# Patient Record
Sex: FEMALE | URBAN - METROPOLITAN AREA
[De-identification: names, ages, dates, MRNs, and addresses within clinical notes are randomized per-mention and may not be internally consistent; named-entity substitution may affect disease eponyms.]

---

## 2017-04-27 ENCOUNTER — OFFICE VISIT (OUTPATIENT)
Dept: INTERNAL MEDICINE CLINIC | Age: 32
End: 2017-04-27

## 2017-04-27 VITALS
TEMPERATURE: 98.8 F | HEIGHT: 63 IN | BODY MASS INDEX: 29.59 KG/M2 | SYSTOLIC BLOOD PRESSURE: 138 MMHG | OXYGEN SATURATION: 98 % | RESPIRATION RATE: 18 BRPM | WEIGHT: 167 LBS | HEART RATE: 88 BPM | DIASTOLIC BLOOD PRESSURE: 82 MMHG

## 2017-04-27 DIAGNOSIS — Z00.00 ROUTINE GENERAL MEDICAL EXAMINATION AT A HEALTH CARE FACILITY: Primary | ICD-10-CM

## 2017-04-27 DIAGNOSIS — J02.9 ACUTE PHARYNGITIS, UNSPECIFIED ETIOLOGY: ICD-10-CM

## 2017-04-27 DIAGNOSIS — E66.3 OVERWEIGHT (BMI 25.0-29.9): ICD-10-CM

## 2017-04-27 RX ORDER — PHENTERMINE HYDROCHLORIDE 37.5 MG/1
37.5 TABLET ORAL
Qty: 30 TAB | Refills: 0 | Status: SHIPPED | OUTPATIENT
Start: 2017-04-27 | End: 2017-05-25 | Stop reason: SDUPTHER

## 2017-04-27 RX ORDER — AMOXICILLIN 500 MG/1
500 CAPSULE ORAL 2 TIMES DAILY
Qty: 20 CAP | Refills: 0 | Status: SHIPPED | OUTPATIENT
Start: 2017-04-27 | End: 2018-02-23 | Stop reason: ALTCHOICE

## 2017-04-27 NOTE — PATIENT INSTRUCTIONS

## 2017-04-27 NOTE — PROGRESS NOTES
History:   Ramiro Hammonds is a 28 y.o. female presenting today for an initial visit for Complete Physical and Weight Loss  She needs a PCP and lives nearby. Went to [facility] for acute problems. 1.  CPE:  Hasn't had blood work recently. Maybe had cholesterol checked but no h/o hyperlipidemia. Has had anemia before. Took iron and resolved. Was dietary, not blood loss. 2.  Overweight:  She has been working out and eating better. But still has been gaining weight. Was 120s-130s in past.  Has slowly gained this weight. She's been on phentermine and would like to try it again. She was on 37.5 mg. No side effects. 3.  ST:  Her throat is very sore. For 6 days. Had headache, but no fever, no congestion, cough. She does have history of strep a number of times requiring abx like Amoxil. No sick contacts    4. Toenail problems:  She says several of her small toenails are yellowish. But she's wearing toenail polish so can't show them to me today. Review of Systems   Constitutional: Negative. HENT: Positive for sore throat. Eyes: Positive for blurred vision. Respiratory: Negative. Cardiovascular: Negative. Gastrointestinal: Negative. Genitourinary: Negative. Musculoskeletal: Negative. Skin: Negative. Neurological: Positive for headaches. Psychiatric/Behavioral: Negative. Past Medical History:   Diagnosis Date    Headache        Past Surgical History:   Procedure Laterality Date    HX WISDOM TEETH EXTRACTION         Social History     Social History    Marital status:      Spouse name: N/A    Number of children: N/A    Years of education: N/A     Occupational History    Not on file.      Social History Main Topics    Smoking status: Never Smoker    Smokeless tobacco: Never Used    Alcohol use No    Drug use: No    Sexual activity: Yes     Partners: Male     Birth control/ protection: None     Other Topics Concern    Not on file     Social History Narrative    No narrative on file       Family History   Problem Relation Age of Onset    Elevated Lipids Mother     Hypertension Mother     Diabetes Father     Elevated Lipids Father     Hypertension Father     Bleeding Prob Maternal Grandmother        No current outpatient prescriptions on file prior to visit. No current facility-administered medications on file prior to visit. No Known Allergies    Objective:   VS:    Visit Vitals    /82 (BP 1 Location: Left arm, BP Patient Position: Sitting)    Pulse 88    Temp 98.8 °F (37.1 °C) (Oral)    Resp 18    Ht 5' 3\" (1.6 m)    Wt 167 lb (75.8 kg)    SpO2 98%    BMI 29.58 kg/m2     Physical Exam   Constitutional: She appears well-developed and well-nourished. No distress. HENT:   Head: Normocephalic and atraumatic. Right Ear: External ear normal.   Left Ear: External ear normal.   Nose: Nose normal.   Throat: Tonsils erythematous but no exudates   Eyes: Conjunctivae are normal. Pupils are equal, round, and reactive to light. No scleral icterus. Neck: Normal range of motion. Neck supple. No tracheal deviation present. No thyromegaly present. Cardiovascular: Normal rate, regular rhythm, normal heart sounds and intact distal pulses. Exam reveals no gallop and no friction rub. No murmur heard. Pulmonary/Chest: Effort normal and breath sounds normal. She has no wheezes. She has no rales. Abdominal: Soft. Bowel sounds are normal. She exhibits no distension. There is no hepatosplenomegaly. There is no tenderness. Musculoskeletal: Normal range of motion. She exhibits no edema or tenderness. Lymphadenopathy:     She has cervical adenopathy (bilateral anterior:  tender, mildly enlarged). Skin: Skin is warm and dry. No rash noted. No pallor. Psychiatric: She has a normal mood and affect. Judgment normal.   Nursing note and vitals reviewed.       Assessment/ Plan:     [facility] was seen today for complete physical and weight loss. Diagnoses and all orders for this visit:    Routine general medical examination at a health care facility  -     LIPID PANEL; Future  -     METABOLIC PANEL, COMPREHENSIVE; Future  -     CBC WITH AUTOMATED DIFF; Future  -     TSH 3RD GENERATION; Future  -     AMB POC URINALYSIS DIP STICK AUTO W/O MICRO    Acute pharyngitis, unspecified etiology: Will tx empirically for strep since the hx and exam are suggestive. -     amoxicillin (AMOXIL) 500 mg capsule; Take 1 Cap by mouth two (2) times a day. Overweight (BMI 25.0-29. 9): Will try phentermine which she's had before. Recommended doing along with diet and exercise. Will recheck in 1 month. -     phentermine (ADIPEX-P) 37.5 mg tablet; Take 1 Tab by mouth every morning. Max Daily Amount: 37.5 mg.     I have discussed the diagnosis with the patient and the intended plan as seen in the above orders. The patient verbalized understanding and agrees with the plan. Follow-up Disposition:  Return in about 1 month (around 5/27/2017) for weight management.     Estefani Elder MD

## 2017-04-27 NOTE — PROGRESS NOTES
Chief Complaint   Patient presents with    Complete Physical    Weight Loss   1. Have you been to the ER, urgent care clinic since your last visit? Hospitalized since your last visit? No    2. Have you seen or consulted any other health care providers outside of the [facility] since your last visit? Include any pap smears or colon screening.  No

## 2017-04-27 NOTE — LETTER
NOTIFICATION RETURN TO WORK / SCHOOL 
 
4/27/2017 11:22 AM 
 
Ms. Wolfgang Campbell [address] [place] [address] To Whom It May Concern: 
 
Wolfgang Campbell is currently under the care of [address] [place] [address]. She will return to work on: 4/28/17 If there are questions or concerns please have the patient contact our office.  
 
 
 
Sincerely, 
 
 
Sandra Davis MD

## 2017-04-27 NOTE — MR AVS SNAPSHOT
Visit Information Date & Time Provider Department Dept. Phone Encounter #  
 4/27/2017  9:30 AM Torres Smith MD [facility] [phone] [id number] Follow-up Instructions Return in about 1 month (around 5/27/2017) for weight management. Upcoming Health Maintenance Date Due DTaP/Tdap/Td series (1 - Tdap) 8/9/2007 PAP AKA CERVICAL CYTOLOGY 8/9/2007 INFLUENZA AGE 9 TO ADULT 8/1/2016 Allergies as of 4/27/2017  Review Complete On: 4/27/2017 By: Torres Smith MD  
 No Known Allergies Current Immunizations  Never Reviewed No immunizations on file. Not reviewed this visit You Were Diagnosed With   
  
 Codes Comments Routine general medical examination at a health care facility    -  Primary ICD-10-CM: Z00.00 ICD-9-CM: V70.0 Acute pharyngitis, unspecified etiology     ICD-10-CM: J02.9 ICD-9-CM: 381 Overweight (BMI 25.0-29. 9)     ICD-10-CM: F37.2 ICD-9-CM: 278.02 Vitals Height(growth percentile) Weight(growth percentile) BMI OB Status Smoking Status 5' 3\" (1.6 m) 167 lb (75.8 kg) 29.58 kg/m2 Having regular periods Never Smoker BMI and BSA Data Body Mass Index Body Surface Area  
 29.58 kg/m 2 1.84 m 2 Preferred Pharmacy [facility] [address] [place] [address] [phone] Your Updated Medication List  
  
   
This list is accurate as of: 4/27/17 10:11 AM.  Always use your most recent med list.  
  
  
  
  
 amoxicillin 500 mg capsule Commonly known as:  AMOXIL Take 1 Cap by mouth two (2) times a day. phentermine 37.5 mg tablet Commonly known as:  ADIPEX-P Take 1 Tab by mouth every morning. Max Daily Amount: 37.5 mg.  
  
  
  
  
Prescriptions Printed Refills  
 phentermine (ADIPEX-P) 37.5 mg tablet 0 Sig: Take 1 Tab by mouth every morning. Max Daily Amount: 37.5 mg.  
 Class: Print  Route: Oral  
  
 Prescriptions Sent to Pharmacy Refills  
 amoxicillin (AMOXIL) 500 mg capsule 0 Sig: Take 1 Cap by mouth two (2) times a day. Class: Normal  
 Pharmacy: [address], [address] [facility] #: [phone] Route: Oral  
  
Follow-up Instructions Return in about 1 month (around 5/27/2017) for weight management. Patient Instructions Well Visit, Ages 25 to 48: Care Instructions Your Care Instructions Physical exams can help you stay healthy. Your doctor has checked your overall health and may have suggested ways to take good care of yourself. He or she also may have recommended tests. At home, you can help prevent illness with healthy eating, regular exercise, and other steps. Follow-up care is a key part of your treatment and safety. Be sure to make and go to all appointments, and call your doctor if you are having problems. It's also a good idea to know your test results and keep a list of the medicines you take. How can you care for yourself at home? · Reach and stay at a healthy weight. This will lower your risk for many problems, such as obesity, diabetes, heart disease, and high blood pressure. · Get at least 30 minutes of physical activity on most days of the week. Walking is a good choice. You also may want to do other activities, such as running, swimming, cycling, or playing tennis or team sports. Discuss any changes in your exercise program with your doctor. · Do not smoke or allow others to smoke around you. If you need help quitting, talk to your doctor about stop-smoking programs and medicines. These can increase your chances of quitting for good. · Talk to your doctor about whether you have any risk factors for sexually transmitted infections (STIs). Having one sex partner (who does not have STIs and does not have sex with anyone else) is a good way to avoid these infections. · Use birth control if you do not want to have children at this time. Talk with your doctor about the choices available and what might be best for you. · Protect your skin from too much sun. When you're outdoors from 10 a.m. to 4 p.m., stay in the shade or cover up with clothing and a hat with a wide brim. Wear sunglasses that block UV rays. Even when it's cloudy, put broad-spectrum sunscreen (SPF 30 or higher) on any exposed skin. · See a dentist one or two times a year for checkups and to have your teeth cleaned. · Wear a seat belt in the car. · Drink alcohol in moderation, if at all. That means no more than 2 drinks a day for men and 1 drink a day for women. Follow your doctor's advice about when to have certain tests. These tests can spot problems early. For everyone · Cholesterol. Have the fat (cholesterol) in your blood tested after age 21. Your doctor will tell you how often to have this done based on your age, family history, or other things that can increase your risk for heart disease. · Blood pressure. Have your blood pressure checked during a routine doctor visit. Your doctor will tell you how often to check your blood pressure based on your age, your blood pressure results, and other factors. · Vision. Talk with your doctor about how often to have a glaucoma test. 
· Diabetes. Ask your doctor whether you should have tests for diabetes. · Colon cancer. Have a test for colon cancer at age 48. You may have one of several tests. If you are younger than 48, you may need a test earlier if you have any risk factors. Risk factors include whether you already had a precancerous polyp removed from your colon or whether your parent, brother, sister, or child has had colon cancer. For women · Breast exam and mammogram. Talk to your doctor about when you should have a clinical breast exam and a mammogram. Medical experts differ on whether and how often women under 50 should have these tests.  Your doctor can help you decide what is right for you. · Pap test and pelvic exam. Begin Pap tests at age 24. A Pap test is the best way to find cervical cancer. The test often is part of a pelvic exam. Ask how often to have this test. 
· Tests for sexually transmitted infections (STIs). Ask whether you should have tests for STIs. You may be at risk if you have sex with more than one person, especially if your partners do not wear condoms. For men · Tests for sexually transmitted infections (STIs). Ask whether you should have tests for STIs. You may be at risk if you have sex with more than one person, especially if you do not wear a condom. · Testicular cancer exam. Ask your doctor whether you should check your testicles regularly. · Prostate exam. Talk to your doctor about whether you should have a blood test (called a PSA test) for prostate cancer. Experts differ on whether and when men should have this test. Some experts suggest it if you are older than 39 and are -American or have a father or brother who got prostate cancer when he was younger than 72. When should you call for help? Watch closely for changes in your health, and be sure to contact your doctor if you have any problems or symptoms that concern you. Where can you learn more? Go to http://chacha-adria.info/. Enter P072 in the search box to learn more about \"Well Visit, Ages 25 to 48: Care Instructions. \" Current as of: July 19, 2016 Content Version: 11.2 © 6540-8228 Healthwise, Incorporated. Care instructions adapted under license by [employer] (which disclaims liability or warranty for this information). If you have questions about a medical condition or this instruction, always ask your healthcare professional. [place] [address] any warranty or liability for your use of this information. Introducing [facility]!    
 Heather Rosa introduces [employer] patient portal. Now you can access parts of your medical record, email your doctor's office, and request medication refills online. 1. In your internet browser, go to [email or web address] 2. Click on the First Time User? Click Here link in the Sign In box. You will see the New Member Sign Up page. 3. Enter your [employer] Access Code exactly as it appears below. You will not need to use this code after youve completed the sign-up process. If you do not sign up before the expiration date, you must request a new code. · [employer] Access Code: [email or web address] Expires: 7/26/2017 10:10 AM 
 
4. Enter the last four digits of your Social Security Number (xxxx) and Date of Birth (mm/dd/yyyy) as indicated and click Submit. You will be taken to the next sign-up page. 5. Create a [employer] ID. This will be your [employer] login ID and cannot be changed, so think of one that is secure and easy to remember. 6. Create a [employer] password. You can change your password at any time. 7. Enter your Password Reset Question and Answer. This can be used at a later time if you forget your password. 8. Enter your e-mail address. You will receive e-mail notification when new information is available in [address] 9. Click Sign Up. You can now view and download portions of your medical record. 10. Click the Download Summary menu link to download a portable copy of your medical information. If you have questions, please visit the Frequently Asked Questions section of the [employer] website. Remember, [employer] is NOT to be used for urgent needs. For medical emergencies, dial 911. Now available from your iPhone and Android! Please provide this summary of care documentation to your next provider. If you have any questions after today's visit, please call [phone].

## 2017-05-02 ENCOUNTER — TELEPHONE (OUTPATIENT)
Dept: INTERNAL MEDICINE CLINIC | Age: 32
End: 2017-05-02

## 2017-05-02 NOTE — TELEPHONE ENCOUNTER
Please call the patient and let her know that her blood work came back. Everything was normal.  No anemia. Only abnormality was cholesterol (229 with ). She doesn't need medication, but diet, exercise, and weight loss will help.

## 2017-05-08 DIAGNOSIS — Z00.00 ROUTINE GENERAL MEDICAL EXAMINATION AT A HEALTH CARE FACILITY: ICD-10-CM

## 2017-05-25 ENCOUNTER — OFFICE VISIT (OUTPATIENT)
Dept: INTERNAL MEDICINE CLINIC | Age: 32
End: 2017-05-25

## 2017-05-25 VITALS
HEART RATE: 65 BPM | RESPIRATION RATE: 18 BRPM | BODY MASS INDEX: 28.88 KG/M2 | DIASTOLIC BLOOD PRESSURE: 73 MMHG | HEIGHT: 63 IN | TEMPERATURE: 98 F | WEIGHT: 163 LBS | OXYGEN SATURATION: 98 % | SYSTOLIC BLOOD PRESSURE: 113 MMHG

## 2017-05-25 DIAGNOSIS — E66.3 OVERWEIGHT (BMI 25.0-29.9): ICD-10-CM

## 2017-05-25 RX ORDER — PHENTERMINE HYDROCHLORIDE 37.5 MG/1
37.5 TABLET ORAL
Qty: 30 TAB | Refills: 1 | Status: SHIPPED | OUTPATIENT
Start: 2017-05-25 | End: 2018-02-23 | Stop reason: ALTCHOICE

## 2017-05-25 NOTE — PROGRESS NOTES
Chief Complaint   Patient presents with    Weight Management     1. Have you been to the ER, urgent care clinic since your last visit? Hospitalized since your last visit? No    2. Have you seen or consulted any other health care providers outside of the [address] [place] [address] since your last visit? Include any pap smears or colon screening. No    Pt needs refill on phentermine.

## 2017-05-25 NOTE — MR AVS SNAPSHOT
Visit Information Date & Time Provider Department Dept. Phone Encounter #  
 5/25/2017  4:15 PM Skye Chester MD [facility] [phone] [id number] Follow-up Instructions Return in about 2 months (around 7/25/2017) for weight management. Upcoming Health Maintenance Date Due DTaP/Tdap/Td series (1 - Tdap) 8/9/2007 PAP AKA CERVICAL CYTOLOGY 8/9/2007 INFLUENZA AGE 9 TO ADULT 8/1/2017 Allergies as of 5/25/2017  Review Complete On: 5/25/2017 By: Skye Chester MD  
 No Known Allergies Current Immunizations  Never Reviewed No immunizations on file. Not reviewed this visit You Were Diagnosed With   
  
 Codes Comments Overweight (BMI 25.0-29. 9)     ICD-10-CM: W89.1 ICD-9-CM: 278.02 Vitals BP Pulse Temp Resp Height(growth percentile) Weight(growth percentile) 113/73 (BP 1 Location: Left arm, BP Patient Position: Sitting) 65 98 °F (36.7 °C) (Oral) 18 5' 3\" (1.6 m) 163 lb (73.9 kg) LMP SpO2 BMI OB Status Smoking Status 05/25/2017 98% 28.87 kg/m2 Having regular periods Never Smoker BMI and BSA Data Body Mass Index Body Surface Area  
 28.87 kg/m 2 1.81 m 2 [facility]erred Pharmacy [facility] [address] [place] [address] [facility] [phone] Your Updated Medication List  
  
   
This list is accurate as of: 5/25/17  4:40 PM.  Always use your most recent med list.  
  
  
  
  
 amoxicillin 500 mg capsule Commonly known as:  AMOXIL Take 1 Cap by mouth two (2) times a day. phentermine 37.5 mg tablet Commonly known as:  ADIPEX-P Take 1 Tab by mouth every morning. Max Daily Amount: 37.5 mg.  
  
  
  
  
Prescriptions Printed Refills  
 phentermine (ADIPEX-P) 37.5 mg tablet 1 Sig: Take 1 Tab by mouth every morning. Max Daily Amount: 37.5 mg.  
 Class: Print Route: Oral  
  
Follow-up Instructions Return in about 2 months (around 7/25/2017) for weight management. Introducing [facility]! Quintin Peralta introduces [employer] patient portal. Now you can access parts of your medical record, email your doctor's office, and request medication refills online. 1. In your internet browser, go to [email or web address] 2. Click on the First Time User? Click Here link in the Sign In box. You will see the New Member Sign Up page. 3. Enter your [employer] Access Code exactly as it appears below. You will not need to use this code after youve completed the sign-up process. If you do not sign up before the expiration date, you must request a new code. · [email or web address] Access Code: [email or web address] Expires: 7/26/2017 10:10 AM 
 
4. Enter the last four digits of your Social Security Number (xxxx) and Date of Birth (mm/dd/yyyy) as indicated and click Submit. You will be taken to the next sign-up page. 5. Create a [employer] ID. This will be your [employer] login ID and cannot be changed, so think of one that is secure and easy to remember. 6. Create a [employer] password. You can change your password at any time. 7. Enter your Password Reset Question and Answer. This can be used at a later time if you forget your password. 8. Enter your e-mail address. You will receive e-mail notification when new information is available in [address] 9. Click Sign Up. You can now view and download portions of your medical record. 10. Click the Download Summary menu link to download a portable copy of your medical information. If you have questions, please visit the Frequently Asked Questions section of the [employer] website. Remember, [employer] is NOT to be used for urgent needs. For medical emergencies, dial 911. Now available from your iPhone and Android! Please provide this summary of care documentation to your next provider. If you have any questions after today's visit, please call [phone].

## 2018-02-23 ENCOUNTER — OFFICE VISIT (OUTPATIENT)
Dept: INTERNAL MEDICINE CLINIC | Age: 33
End: 2018-02-23

## 2018-02-23 VITALS
SYSTOLIC BLOOD PRESSURE: 119 MMHG | OXYGEN SATURATION: 98 % | HEIGHT: 63 IN | DIASTOLIC BLOOD PRESSURE: 84 MMHG | RESPIRATION RATE: 18 BRPM | WEIGHT: 168 LBS | BODY MASS INDEX: 29.77 KG/M2 | TEMPERATURE: 98.6 F | HEART RATE: 63 BPM

## 2018-02-23 DIAGNOSIS — K13.79 MOUTH SORES: ICD-10-CM

## 2018-02-23 DIAGNOSIS — M12.9 ARTHRITIS, MULTIPLE JOINT INVOLVEMENT: ICD-10-CM

## 2018-02-23 DIAGNOSIS — M79.10 MYALGIA: ICD-10-CM

## 2018-02-23 DIAGNOSIS — R53.82 CHRONIC FATIGUE: Primary | ICD-10-CM

## 2018-02-23 NOTE — PROGRESS NOTES
Chief Complaint   Patient presents with    Fatigue    Sore Throat     thats been for 6 months    Mouth Lesions     off and on for a month     1. Have you been to the ER, urgent care clinic since your last visit? Hospitalized since your last visit? No    2. Have you seen or consulted any other health care providers outside of the [address] [place] [address] since your last visit? Include any pap smears or colon screening.  No  '

## 2018-02-23 NOTE — PROGRESS NOTES
Subjective:   Patient is a 33y.o. year old female who presents for Fatigue; Sore Throat (thats been for 6 months); and Mouth Lesions (off and on for a month)  GERD:  Started last summer. Has not gotten any better. Fatigue: In the last 2 months, symptoms have grown a lot worse. Sleeps 11 hours/night but wakes unrefreshed. Very susceptible to colds. Sick on and off for over a month. Her brain is very foggy. Went to [facility] earlier this week because couldn't get in with us. Had ST and they checked strep and was neg. Got rash that came and went. Having a lot of joint pains in the right knee and right shoulder. Hips hurt all the time too. Has also had flu. Now having sinus symptoms. Has myalgias all the time. Mostly in the legs. Drinks a lot of water so not dehydration. No hx of tick bites. Mouth sores:  She's also having sores in mouth. First with a small alrieza on the inside of the mouth. Was painful and swollen. Then looked in and was bruised. Then developed pain in left side of mouth, head, shoulder. Then the next day had another on the left side of the mouth. Constipation:  Now can only have bowel movement when takes laxative (Dulcolax). Has tried fiber. Review of Systems   Constitutional: Positive for malaise/fatigue. Negative for fever. Cardiovascular: Negative. Neurological: Negative. No current outpatient prescriptions on file prior to visit. No current facility-administered medications on file prior to visit. Reviewed PmHx, RxHx, FmHx, SocHx, AllgHx and updated and dated in the chart. Nurse notes were reviewed and are correct    Objective:     Vitals:    02/23/18 0955   BP: 119/84   Pulse: 63   Resp: 18   Temp: 98.6 °F (37 °C)   TempSrc: Oral   SpO2: 98%   Weight: 168 lb (76.2 kg)   Height: 5' 3\" (1.6 m)     Physical Exam   Constitutional: She is oriented to person, place, and time. She appears well-developed and well-nourished.  No distress. HENT:   Head: Normocephalic and atraumatic. Right Ear: External ear normal.   Left Ear: External ear normal.   Nose: Nose normal.   Mouth/Throat: Oropharynx is clear and moist.   Eyes: EOM are normal. Pupils are equal, round, and reactive to light. Neck: Normal range of motion. Neck supple. Carotid bruit is not present. No tracheal deviation present. Cardiovascular: Normal rate, regular rhythm, normal heart sounds and intact distal pulses. Exam reveals no gallop and no friction rub. No murmur heard. Pulmonary/Chest: Effort normal and breath sounds normal. She has no wheezes. She has no rales. Abdominal: Soft. Bowel sounds are normal. She exhibits no distension. There is no tenderness. Musculoskeletal: She exhibits no edema or tenderness. Lymphadenopathy:     She has no cervical adenopathy. Neurological: She is alert and oriented to person, place, and time. Skin: Skin is warm and dry. Psychiatric: She has a normal mood and affect. Her behavior is normal.   Nursing note and vitals reviewed. U/A neg    Assessment/ Plan:     Diagnoses and all orders for this visit:    1. Chronic fatigue:  Uncertain of etiology but has been of rather fast onset. Getting blood work to look for cause of all her symptoms.  -     CBC WITH AUTOMATED DIFF; Future  -     METABOLIC PANEL, COMPREHENSIVE; Future  -     VITAMIN D, 25 HYDROXY; Future  -     TSH 3RD GENERATION; Future  -     SED RATE (ESR); Future  -     VICKI, DIRECT, W/REFLEX; Future  -     C REACTIVE PROTEIN, QT; Future  -     RHEUMATOID FACTOR, QL; Future  -     CYCLIC CITRUL PEPTIDE AB, IGG; Future  -     CK; Future    2. Myalgia  -     SED RATE (ESR); Future  -     CK; Future    3. Arthritis, multiple joint involvement  -     SED RATE (ESR); Future  -     VICKI, DIRECT, W/REFLEX; Future  -     C REACTIVE PROTEIN, QT; Future  -     RHEUMATOID FACTOR, QL; Future  -     CYCLIC CITRUL PEPTIDE AB, IGG; Future    4. Mouth sores:   Those have resolved and mouth normal today. Showed me pictures but unable to tell what it was. Told her to RTC or go to dentist if recurs. I have discussed the diagnosis with the patient and the intended plan as seen in the above orders. The patient verbalized understanding and agrees with the plan.     Follow-up Disposition: Not on File    Sarahy Murray MD

## 2018-02-23 NOTE — MR AVS SNAPSHOT
Adamaris Rj 
 
 
 [facility] [address] [id number] 
[phone] Patient: Rosio Mckinnon MRN: [id number] [facility]:[birth year] Visit Information Date & Time Provider Department Dept. Phone Encounter #  
 2/23/2018 10:00 AM Adriana Watt MD [id number] [phone] [id number] Follow-up Instructions Return in about 2 weeks (around 3/9/2018) for fatigue, myalgia, joint pains. Upcoming Health Maintenance Date Due DTaP/Tdap/Td series (1 - Tdap) 8/9/2007 PAP AKA CERVICAL CYTOLOGY 8/9/2007 Influenza Age 5 to Adult 8/1/2017 Allergies as of 2/23/2018  Review Complete On: 2/23/2018 By: Adriana Watt MD  
 No Known Allergies Current Immunizations  Never Reviewed No immunizations on file. Not reviewed this visit You Were Diagnosed With   
  
 Codes Comments Chronic fatigue    -  Primary ICD-10-CM: R53.82 
ICD-9-CM: 780.79 Myalgia     ICD-10-CM: M79.1 ICD-9-CM: 729.1 Arthritis, multiple joint involvement     ICD-10-CM: M12.9 ICD-9-CM: 716.99 Mouth sores     ICD-10-CM: K13.79 ICD-9-CM: 528.9 Vitals BP Pulse Temp Resp Height(growth percentile) Weight(growth percentile) 119/84 (BP 1 Location: Left arm, BP Patient Position: Sitting) 63 98.6 °F (37 °C) (Oral) 18 5' 3\" (1.6 m) 168 lb (76.2 kg) LMP SpO2 BMI OB Status Smoking Status 02/11/2018 98% 29.76 kg/m2 Having regular periods Never Smoker BMI and BSA Data Body Mass Index Body Surface Area  
 29.76 kg/m 2 1.84 m 2 Preferred Pharmacy Pharmacy Name Phone [facility] [address] [place] [address] [facility] [phone] Your Updated Medication List  
  
Notice  As of 2/23/2018 10:33 AM  
 You have not been prescribed any medications. We Performed the Following VICKI, DIRECT, W/REFLEX [id number] CPT(R)] C REACTIVE PROTEIN, QT [43464 CPT(R)] CBC WITH AUTOMATED DIFF [47261 CPT(R)] CK [id number] CPT(R)] [address], IGG [id number] CPT(R)] METABOLIC PANEL, COMPREHENSIVE [72274 CPT(R)] RHEUMATOID FACTOR, QL [id number] CPT(R)] SED RATE (ESR) [id number] CPT(R)] TSH 3RD GENERATION [83526 CPT(R)] VITAMIN D, 25 HYDROXY H062035 CPT(R)] Follow-up Instructions Return in about 2 weeks (around 3/9/2018) for fatigue, myalgia, joint pains. To-Do List   
 02/23/2018 Lab:  VICKI, DIRECT, W/REFLEX   
  
 02/23/2018 Lab:  C REACTIVE PROTEIN, QT   
  
 02/23/2018 Lab:  CBC WITH AUTOMATED DIFF   
  
 02/23/2018 Lab:  CK   
  
 02/23/2018 Lab:  CYCLIC CITRUL PEPTIDE AB, IGG   
  
 02/23/2018 Lab:  METABOLIC PANEL, COMPREHENSIVE   
  
 02/23/2018 Lab:  RHEUMATOID FACTOR, QL   
  
 02/23/2018 Lab:  SED RATE (ESR)   
  
 02/23/2018 Lab:  TSH 3RD GENERATION   
  
 02/23/2018 Lab:  VITAMIN D, 25 HYDROXY Introducing [facility] SERVICES! Yola Valentin introduces [employer] patient portal. Now you can access parts of your medical record, email your doctor's office, and request medication refills online. 1. In your internet browser, go to [email or web address] 2. Click on the First Time User? Click Here link in the Sign In box. You will see the New Member Sign Up page. 3. Enter your [employer] Access Code exactly as it appears below. You will not need to use this code after youve completed the sign-up process. If you do not sign up before the expiration date, you must request a new code. · [employer] Access Code: [email or web address] Expires: 5/24/2018 10:33 AM 
 
4. Enter the last four digits of your Social Security Number (xxxx) and Date of Birth (mm/dd/yyyy) as indicated and click Submit. You will be taken to the next sign-up page. 5. Create a [employer] ID. This will be your [employer] login ID and cannot be changed, so think of one that is secure and easy to remember. 6. Create a [employer] password. You can change your password at any time. 7. Enter your Password Reset Question and Answer. This can be used at a later time if you forget your password. 8. Enter your e-mail address. You will receive e-mail notification when new information is available in [address] 9. Click Sign Up. You can now view and download portions of your medical record. 10. Click the Download Summary menu link to download a portable copy of your medical information. If you have questions, please visit the Frequently Asked Questions section of the [employer] website. Remember, [employer] is NOT to be used for urgent needs. For medical emergencies, dial 911. Now available from your iPhone and Android! Please provide this summary of care documentation to your next provider. If you have any questions after today's visit, please call [phone].

## 2018-02-27 LAB
25(OH)D3+25(OH)D2 SERPL-MCNC: 27.4 NG/ML (ref 30–100)
ALBUMIN SERPL-MCNC: 4.7 G/DL (ref 3.5–5.5)
ALBUMIN/GLOB SERPL: 1.7 {RATIO} (ref 1.2–2.2)
ALP SERPL-CCNC: 71 IU/L (ref 39–117)
ALT SERPL-CCNC: 30 IU/L (ref 0–32)
ANA SER QL: NEGATIVE
AST SERPL-CCNC: 26 IU/L (ref 0–40)
BASOPHILS # BLD AUTO: 0.1 X10E3/UL (ref 0–0.2)
BASOPHILS NFR BLD AUTO: 1 %
BILIRUB SERPL-MCNC: <0.2 MG/DL (ref 0–1.2)
BUN SERPL-MCNC: 8 MG/DL (ref 6–20)
BUN/CREAT SERPL: 13 (ref 9–23)
CALCIUM SERPL-MCNC: 9.5 MG/DL (ref 8.7–10.2)
CCP IGA+IGG SERPL IA-ACNC: 4 UNITS (ref 0–19)
CHLORIDE SERPL-SCNC: 98 MMOL/L (ref 96–106)
CK SERPL-CCNC: 92 U/L (ref 24–173)
CO2 SERPL-SCNC: 22 MMOL/L (ref 18–29)
CREAT SERPL-MCNC: 0.64 MG/DL (ref 0.57–1)
CRP SERPL-MCNC: 2.2 MG/L (ref 0–4.9)
EOSINOPHIL # BLD AUTO: 0.2 X10E3/UL (ref 0–0.4)
EOSINOPHIL NFR BLD AUTO: 3 %
ERYTHROCYTE [DISTWIDTH] IN BLOOD BY AUTOMATED COUNT: 15.1 % (ref 12.3–15.4)
ERYTHROCYTE [SEDIMENTATION RATE] IN BLOOD BY WESTERGREN METHOD: 3 MM/HR (ref 0–32)
GFR SERPLBLD CREATININE-BSD FMLA CKD-EPI: 119 ML/MIN/1.73
GFR SERPLBLD CREATININE-BSD FMLA CKD-EPI: 138 ML/MIN/1.73
GLOBULIN SER CALC-MCNC: 2.7 G/DL (ref 1.5–4.5)
GLUCOSE SERPL-MCNC: 85 MG/DL (ref 65–99)
HCT VFR BLD AUTO: 40.8 % (ref 34–46.6)
HGB BLD-MCNC: 13.6 G/DL (ref 11.1–15.9)
IMM GRANULOCYTES # BLD: 0 X10E3/UL (ref 0–0.1)
IMM GRANULOCYTES NFR BLD: 0 %
LYMPHOCYTES # BLD AUTO: 1.7 X10E3/UL (ref 0.7–3.1)
LYMPHOCYTES NFR BLD AUTO: 25 %
MCH RBC QN AUTO: 29.6 PG (ref 26.6–33)
MCHC RBC AUTO-ENTMCNC: 33.3 G/DL (ref 31.5–35.7)
MCV RBC AUTO: 89 FL (ref 79–97)
MONOCYTES # BLD AUTO: 0.5 X10E3/UL (ref 0.1–0.9)
MONOCYTES NFR BLD AUTO: 7 %
NEUTROPHILS # BLD AUTO: 4.4 X10E3/UL (ref 1.4–7)
NEUTROPHILS NFR BLD AUTO: 64 %
PLATELET # BLD AUTO: 367 X10E3/UL (ref 150–379)
POTASSIUM SERPL-SCNC: 4.7 MMOL/L (ref 3.5–5.2)
PROT SERPL-MCNC: 7.4 G/DL (ref 6–8.5)
RBC # BLD AUTO: 4.6 X10E6/UL (ref 3.77–5.28)
RHEUMATOID FACT SERPL-ACNC: <10 IU/ML (ref 0–13.9)
SODIUM SERPL-SCNC: 137 MMOL/L (ref 134–144)
TSH SERPL DL<=0.005 MIU/L-ACNC: 2.54 UIU/ML (ref 0.45–4.5)
WBC # BLD AUTO: 6.9 X10E3/UL (ref 3.4–10.8)

## 2018-02-27 NOTE — PROGRESS NOTES
Your blood work all came back normal.  So we didn't find a cause for your symptoms. So we have to figure out where to go from here. I could send you to a rheumatologist.  We could consider fibromyalgia and try to treat that. What would you like to do?

## 2018-10-05 ENCOUNTER — OFFICE VISIT (OUTPATIENT)
Dept: INTERNAL MEDICINE CLINIC | Age: 33
End: 2018-10-05

## 2018-10-05 VITALS
TEMPERATURE: 98.3 F | RESPIRATION RATE: 18 BRPM | HEIGHT: 63 IN | HEART RATE: 103 BPM | DIASTOLIC BLOOD PRESSURE: 85 MMHG | SYSTOLIC BLOOD PRESSURE: 128 MMHG | BODY MASS INDEX: 31.18 KG/M2 | OXYGEN SATURATION: 99 % | WEIGHT: 176 LBS

## 2018-10-05 DIAGNOSIS — R53.1 WEAKNESS: ICD-10-CM

## 2018-10-05 DIAGNOSIS — J02.9 SORE THROAT: Primary | ICD-10-CM

## 2018-10-05 DIAGNOSIS — M79.10 MYALGIA: ICD-10-CM

## 2018-10-05 DIAGNOSIS — J34.89 RHINORRHEA: ICD-10-CM

## 2018-10-05 LAB
QUICKVUE INFLUENZA TEST: NEGATIVE
S PYO AG THROAT QL: NEGATIVE
VALID INTERNAL CONTROL?: YES
VALID INTERNAL CONTROL?: YES

## 2018-10-05 NOTE — PROGRESS NOTES
Subjective:   Patient is a 29y.o. year old female who presents for Cold Symptoms (body aches and pains, sinus pressure)  Had sinus symptoms and went to [facility] on Sunday (5 days ago). They said she had URI. Then after that she started getting worse and having some chest symptoms. So she went to her work clinic and they gave her a Z-pack. Then yesterday she had severe myalgias. She feels bad again. She has myalgias, weakness, sweaty, rhinorrhea (better). She had some sinus pressure/pain on Sunday. Used Sudafed that helped. Had a wet cough but that went away too. All the symptoms started 8 days ago. She's eating ok but not drinking a lot today. ST started today. No fevers but taking anti-fever meds so doesn't know. Review of Systems   Constitutional: Negative. Negative for fever. No current outpatient prescriptions on file prior to visit. No current facility-administered medications on file prior to visit. Reviewed PmHx, RxHx, FmHx, SocHx, AllgHx and updated and dated in the chart. Nurse notes were reviewed and are correct    Objective:     Vitals:    10/05/18 1252   BP: 128/85   Pulse: (!) 103   Resp: 18   Temp: 98.3 °F (36.8 °C)   TempSrc: Oral   SpO2: 99%   Weight: 176 lb (79.8 kg)   Height: 5' 3\" (1.6 m)     Physical Exam   Constitutional: She appears well-developed and well-nourished. No distress. HENT:   Head: Normocephalic and atraumatic. Right Ear: External ear normal.   Left Ear: External ear normal.   Nose: Nose normal.   Mouth/Throat: Oropharynx is clear and moist. No oropharyngeal exudate. Throat erythematous   Eyes: Conjunctivae and EOM are normal. Pupils are equal, round, and reactive to light. Neck: Normal range of motion. Neck supple. Cardiovascular: Normal rate, regular rhythm and normal heart sounds. Exam reveals no gallop and no friction rub. No murmur heard. Pulmonary/Chest: Effort normal and breath sounds normal. No respiratory distress. She has no wheezes. Lymphadenopathy:     She has no cervical adenopathy. Nursing note and vitals reviewed. Assessment/ Plan:     Diagnoses and all orders for this visit:    1. Sore throat:  Flu and strep both neg. Seems to be the residual of a viral infection. Recommended continued symptomatic treatment, plenty of fluids, rest.  -     AMB POC RAPID INFLUENZA TEST  -     AMB POC RAPID STREP A    2. Myalgia  -     AMB POC RAPID INFLUENZA TEST    3. Rhinorrhea  -     AMB POC RAPID INFLUENZA TEST     I have discussed the diagnosis with the patient and the intended plan as seen in the above orders. The patient verbalized understanding and agrees with the plan. Follow-up Disposition:  Return if symptoms worsen or fail to improve.     Guanakito Mosqueda MD

## 2018-10-05 NOTE — LETTER
NOTIFICATION RETURN TO WORK / SCHOOL 
 
10/5/2018 1:38 PM 
 
Ms. Joshua Hall [address] [address] To Whom It May Concern: 
 
Joshua Hall is currently under the care of [address] [place] [address]. She will return to work/school on 10/8/18 If there are questions or concerns please have the patient contact our office.  
 
 
 
Sincerely, 
 
 
Lashanda Pace MD

## 2018-10-05 NOTE — PROGRESS NOTES
Chief Complaint   Patient presents with    Cold Symptoms     body aches and pains, sinus pressure       1. Have you been to the ER, urgent care clinic since your last visit? Hospitalized since your last visit? NO    2. Have you seen or consulted any other health care providers outside of the [facility] since your last visit? Include any pap smears or colon screening. Yes.  Pt 1st on general Casey Rolls